# Patient Record
(demographics unavailable — no encounter records)

---

## 2024-10-28 NOTE — DISCUSSION/SUMMARY
[de-identified] : Today I had a lengthy discussion with the patient regarding their left ankle pain. I have addressed all the patient's concerns surrounding the pathology of their condition. I have reviewed the patient's MRI results with them in great detail.  Focal area of marrow edema at the posterior superior calcaneus may reflect stress reaction or small contusion. No Achilles tendon tear as clinically questioned.  I would like the patient to proceed with conservative management, which was discussed in great detail.     Plan:  1.  I recommend the patient undergo a course of physical therapy for the left ankle 2-3 times a week for a total of 8-12 weeks. A prescription was given for the physical therapy today. 2. I want the patient to continue with home stretching/exercises. 3. I recommend that the patient utilize ice, NSAIDS/Tylenol PRN, and heat.  4. A discussion was had about shoe-wear modifications. I advised the patient to utilize a wide toed cross training sneaker that better accommodates the feet. I recommended New Balance, [place], [place], or [place] to the patient. 5. I would like the patient to utilize OTC antibiotic cream and to leave her wound open to air. She should limit palpation of the wound.   f/u prn.   The patient remains 100% disabled and is currently not working.    The patient understood and verbally agreed to the treatment plan. All of their questions were answered, and they were satisfied with the visit. The patient should call the office if they have any questions or experience worsening symptoms.

## 2024-10-28 NOTE — HISTORY OF PRESENT ILLNESS
[FreeTextEntry1] : 10/28/2024: The patient is a 33-year-old female who presents to the office with an  (ID# [id number])  for follow-up evaluation left ankle, work-related accident and to review the MRI of the left ankle. Her symptoms have improved since last visit. The patient presents to the office in [facility] and ambulating without assistance.  9/25/2024: Patient is a 33-year-old, right hand dominant female who presents in office status post work related injury on 9/11/2024. The patient works for [employer].  The patient works for the tree service and handles the  machine.  During the injury the patient injured left ankle.  She states that her left ankle got caught in between two machines and she injured the left ankle.  The patient was taken by her boss the day of the injury to [facility] where x-rays were taken, and a splint was applied to the left ankle.  X-rays were negative for fractures.  Patient is non-weightbearing and has left ankle pain, selling and decreased range of motion since the injury.    The patient is not working since the injury.  The patient is using crutches.    Pain scale: Left ankle: 6/10   Activities that make the pain better: Rest Ice Heat   Activities that make pain worse: ADL's Lifting heavy objects Chores Work Sleeping Stairs Seating to standing position Walking   #[id number]

## 2024-10-28 NOTE — PHYSICAL EXAM
[de-identified] : Left ankle Physical Examination:  General: Alert and oriented x3.  In no acute distress.  Pleasant in nature with a normal affect.  No apparent respiratory distress.  Erythema, Warmth, Rubor: Negative Swelling: +  ROM: 1. Dorsiflexion: 10 degrees 2. Plantarflexion: 40 degrees 3. Inversion: 30 degrees 4. Eversion: 20 degrees 5. Subtalar: 10 degrees  Tenderness to Palpation:  1. Lateral Malleolus: + distal fibula pain at the tip, improved 2. Medial Malleolus: Negative 3. Proximal Fibular Pain: Negative 4. Heel Pain: Negative 5. Cuboid: Negative 6. Navicular: Negative 7. Tibiotalar Joint: +, improved 8. Subtalar Joint: Negative 9. Posterior Recess: Negative  Tendon Pain: 1. Achilles: + pain along the Achilles tendon midsubstance, improved.  Negative Mcclain test but there is a cut/abrasion that healing on the lateral side of the achilles. 2. Peroneals: Negative 3. Posterior Tibialis: Negative 4. Tibialis Anterior: Negative  Ligament Pain: 1. ATFL: +, improved 2. CFL: +, improved 3. PTFL: Negative 4. Deltoid Ligaments: Negative 5. Lis Franc Ligament: Negative  Stability:  1. Anterior Drawer: Negative 2. Posterior Drawer: Negative  Strength: 5/5 TA/GS/EHL  Pulses: 2+ DP/PT Pulses  Neuro: Intact motor and sensory  Additional Test: 1. Calcaneal Squeeze Test: Negative 2. Syndesmosis Squeeze Test: Negative  *Positive calf pain, positive Marco's sign.    [de-identified] : Radiology imaging reviewed in office with the patient on 10/28/2024 and I agree with the radiologist's impression below.  EXAM: [id number] - MR ANKLE LT  - ORDERED BY: DWIGHT BETANCOURT   PROCEDURE DATE:  10/12/2024    INTERPRETATION:  LEFT ANKLE MRI  CLINICAL INFORMATION: Left ankle pain and swelling after fall. Evaluate for tear of lateral ligaments and Achilles. TECHNIQUE: Multiplanar, multisequence MRI was obtained of the LEFT ankle. COMPARISON: Radiographs of the left foot and ankle 9/11/2024.   FINDINGS:  MUSCLES AND TENDONS: Moderate flattening of the peroneal brevis with reconstitution distally. Trace fluid in the distal peroneal brevis tendon sheath. Small fluid in the distal posterior tibialis tendon sheath. No Achilles tendon tear. Preserved musculature. LIGAMENTS: The syndesmotic ligaments, ATFL, ATFL, and CFL are intact. The medial deltoid and spring ligament complexes are intact. Lisfranc ligament is intact. CARTILAGE and SUBCHONDRAL BONE: Articular cartilage is maintained. SYNOVIUM/JOINT FLUID: No joint effusion. Trace fluid in the retrocalcaneal bursa. MARROW: A small focus of linear marrow edema is noted along the superior surface of the posterior calcaneus. PLANTAR FASCIA: Small plantar calcaneal bone spur. Plantar fascia is intact. NEUROVASCULAR STRUCTURES: Normal course and caliber. Edema is noted contacting the sural neurovascular structures. SINUS TARSI: The fat along the sinus Tarsi is maintained. PERIPHERAL/ SUB-CUTANEOUS SOFT TISSUES: Small skin wound along the posterolateral ankle with subjacent soft tissue edema.  IMPRESSION: 1.  Focal area of marrow edema at the posterior superior calcaneus may reflect stress reaction or small contusion. 2.  No Achilles tendon tear as clinically questioned. 3.  Skin wound with subjacent soft tissue swelling at the posterolateral ankle which surrounds the sural neurovascular structures. Nonspecific but can be seen with cellulitis in the appropriate clinical setting. Correlate clinically. 4.  Flattening the peroneal brevis with reconstitution distally. Small distal posterior tibialis tenosynovitis or fluid.  --- End of Report ---      ODALIS GONZALEZ MD; Resident Radiologist This document has been electronically signed. JESSE PENDLETON MD; Attending Radiologist This document has been electronically signed. Oct 16 2024 11:38AM   	 EXAM: [id number] - US DPLX LWR EXT VEINS LTD LT  - ORDERED BY: OSVALDO WANG   PROCEDURE DATE:  10/12/2024    INTERPRETATION:  CLINICAL INFORMATION: Calf pain. Evaluate for DVT  COMPARISON: None available.  TECHNIQUE: Duplex sonography of the LEFT LOWER extremity veins with color and spectral Doppler, with and without compression.  FINDINGS:  There is normal compressibility of the left common femoral, femoral and popliteal veins. The contralateral common femoral vein is patent. Doppler examination shows normal spontaneous and phasic flow.  No calf vein thrombosis is detected.  IMPRESSION: No evidence of left lower extremity deep venous thrombosis.      --- End of Report ---       RIDGE FISHER MD; Attending Radiologist This document has been electronically signed. Oct 12 2024 10:14AM

## 2024-10-28 NOTE — ADDENDUM
[FreeTextEntry1] : I, Willem Dalton, acted solely as a scribe for Dr. Jagdish Carreon on this date 10/28/2024.   All medical record entries made by the Scribe were at my, Dr. Jagdish Carreon, direction and personally dictated by me on 10/28/2024. I have reviewed the chart and agree that the record accurately reflects my personal performance of the history, physical exam, assessment and plan. I have also personally directed, reviewed, and agreed with the chart.

## 2025-02-28 NOTE — PHYSICAL EXAM
[General Appearance - Alert] : alert [General Appearance - In No Acute Distress] : in no acute distress [Oriented To Time, Place, And Person] : oriented to person, place, and time [Affect] : the affect was normal [Memory Recent] : recent memory was not impaired [Memory Remote] : remote memory was not impaired [Cranial Nerves Optic (II)] : visual acuity intact bilaterally,  visual fields full to confrontation, pupils equal round and reactive to light [Cranial Nerves Oculomotor (III)] : extraocular motion intact [Cranial Nerves Trigeminal (V)] : facial sensation intact symmetrically [Cranial Nerves Facial (VII)] : face symmetrical [Cranial Nerves Vestibulocochlear (VIII)] : hearing was intact bilaterally [Cranial Nerves Glossopharyngeal (IX)] : tongue and palate midline [Cranial Nerves Accessory (XI - Cranial And Spinal)] : head turning and shoulder shrug symmetric [Cranial Nerves Hypoglossal (XII)] : there was no tongue deviation with protrusion [Motor Tone] : muscle tone was normal in all four extremities [Motor Strength] : muscle strength was normal in all four extremities [Sensation Tactile Decrease] : light touch was intact [Sensation Pain / Temperature Decrease] : pain and temperature was intact [Sensation Vibration Decrease] : vibration was intact [Abnormal Walk] : normal gait [2+] : Ankle jerk left 2+ [Optic Disc Abnormality] : the optic disc were normal in size and color [Dysarthria] : no dysarthria [Aphasia] : no dysphasia/aphasia [Romberg's Sign] : Romberg's sign was negtive [Coordination - Dysmetria Impaired Finger-to-Nose Bilateral] : not present [Plantar Reflex Left Only] : normal on the left

## 2025-02-28 NOTE — HISTORY OF PRESENT ILLNESS
[FreeTextEntry1] : I saw this patient in the office today.  The patient describes headaches. This has been going on since adolescence. It has become almost daily. It waxes and wanes in intensity. When severe it is associated with nausea and photophobia.

## 2025-02-28 NOTE — CONSULT LETTER
[Courtesy Letter:] : I had the pleasure of seeing your patient, [unfilled], in my office today. [Please see my note below.] : Please see my note below. [Consult Closing:] : Thank you very much for allowing me to participate in the care of this patient.  If you have any questions, please do not hesitate to contact me. [Sincerely,] : Sincerely, [Dear  ___] : Dear  [unfilled], [FreeTextEntry3] : Reinaldo Marvin MD.

## 2025-02-28 NOTE — ASSESSMENT
[FreeTextEntry1] : This is a 34-year-old woman with what sounds like migraine that has evolved into a more chronic daily pattern. Imaging has been negative.  I have explained that there are essentially 2 strategies for dealing with chronic headaches.  The first is abortive medication of which there are numerous over-the-counter preparations as well as prescription options.  The second strategy is prophylactic medications.  I have explained that these are medications which prevent headaches when taken on a regular basis.  I have explained that there are several medications which have been found to be preventative against headaches.  I have further explained that none of the medications have an immediate effect.  They must build up in the system over a few weeks.  Most people notice that within a few weeks on the medication, the headache intensity is diminishing.  Within a few more weeks most people begin to notice that the frequency is decreasing as well.  I have explained that the preventive medications were all originally used for other conditions but were also found to work against chronic headaches.  The patient seemed to understand my explanation.  I have suggested a trial of Topamax 25 mg to be taken at bedtime in an attempt to decrease the frequency and intensity of the headaches.  I have discussed the potential side effects of the medication with the patient and have advised the patient to call me should any new symptoms occur.  I will see her back in 6 months.

## 2025-02-28 NOTE — DATA REVIEWED
[de-identified] : Brain MRI was performed on 10/04/2023 at [facility]. The study was unremarkable. There was some scattered white matter hyperintensities.